# Patient Record
Sex: FEMALE | Race: WHITE | Employment: FULL TIME | URBAN - METROPOLITAN AREA
[De-identification: names, ages, dates, MRNs, and addresses within clinical notes are randomized per-mention and may not be internally consistent; named-entity substitution may affect disease eponyms.]

---

## 2017-01-17 PROBLEM — J06.9 ACUTE UPPER RESPIRATORY INFECTION: Status: ACTIVE | Noted: 2017-01-17

## 2017-01-17 PROBLEM — L40.50 PSORIATIC ARTHRITIS (HCC): Status: ACTIVE | Noted: 2017-01-17

## 2017-01-17 PROBLEM — R05.9 COUGH: Status: ACTIVE | Noted: 2017-01-17

## 2017-01-17 PROBLEM — R42 DIZZINESS: Status: ACTIVE | Noted: 2017-01-17

## 2017-01-17 PROBLEM — R53.83 FATIGUE, UNSPECIFIED TYPE: Status: ACTIVE | Noted: 2017-01-17

## 2017-01-17 PROBLEM — I10 ESSENTIAL HYPERTENSION: Status: ACTIVE | Noted: 2017-01-17

## 2017-04-08 PROCEDURE — 82607 VITAMIN B-12: CPT | Performed by: INTERNAL MEDICINE

## 2017-05-25 PROBLEM — M17.11 PRIMARY OSTEOARTHRITIS OF RIGHT KNEE: Status: ACTIVE | Noted: 2017-05-25

## 2017-07-03 PROBLEM — I73.9 PERIPHERAL VASCULAR DISEASE (HCC): Status: ACTIVE | Noted: 2017-07-03

## 2017-07-03 PROBLEM — I73.9 PERIPHERAL VASCULAR DISEASE: Status: ACTIVE | Noted: 2017-07-03

## 2017-11-01 PROBLEM — R51.9 NONINTRACTABLE HEADACHE, UNSPECIFIED CHRONICITY PATTERN, UNSPECIFIED HEADACHE TYPE: Status: ACTIVE | Noted: 2017-11-01

## 2017-11-01 PROCEDURE — 84480 ASSAY TRIIODOTHYRONINE (T3): CPT | Performed by: INTERNAL MEDICINE

## 2017-12-15 PROBLEM — M19.072 DJD (DEGENERATIVE JOINT DISEASE), ANKLE AND FOOT, LEFT: Status: ACTIVE | Noted: 2017-12-15

## 2018-01-23 PROBLEM — S42.124A: Status: ACTIVE | Noted: 2018-01-23

## 2018-02-06 PROBLEM — S42.124D: Status: ACTIVE | Noted: 2018-02-06

## 2018-03-08 PROBLEM — M79.18 MYOFASCIAL MUSCLE PAIN: Status: ACTIVE | Noted: 2018-03-08

## 2018-03-29 PROBLEM — R53.1 WEAKNESS: Status: ACTIVE | Noted: 2017-01-17

## 2018-05-07 PROBLEM — M25.511 ACUTE PAIN OF RIGHT SHOULDER: Status: ACTIVE | Noted: 2018-05-07

## 2018-06-30 PROBLEM — J01.00 ACUTE NON-RECURRENT MAXILLARY SINUSITIS: Status: ACTIVE | Noted: 2018-06-30

## 2018-06-30 PROBLEM — H10.33 ACUTE CONJUNCTIVITIS OF BOTH EYES, UNSPECIFIED ACUTE CONJUNCTIVITIS TYPE: Status: ACTIVE | Noted: 2018-06-30

## 2018-06-30 PROBLEM — R09.81 NASAL CONGESTION: Status: ACTIVE | Noted: 2018-06-30

## 2018-09-13 PROBLEM — J06.9 ACUTE UPPER RESPIRATORY INFECTION: Status: RESOLVED | Noted: 2017-01-17 | Resolved: 2018-09-13

## 2018-09-13 PROBLEM — R05.9 COUGH: Status: RESOLVED | Noted: 2017-01-17 | Resolved: 2018-09-13

## 2018-09-13 PROBLEM — J01.00 ACUTE NON-RECURRENT MAXILLARY SINUSITIS: Status: RESOLVED | Noted: 2018-06-30 | Resolved: 2018-09-13

## 2018-09-13 PROBLEM — R53.1 WEAKNESS: Status: RESOLVED | Noted: 2017-01-17 | Resolved: 2018-09-13

## 2018-09-13 PROBLEM — H10.33 ACUTE CONJUNCTIVITIS OF BOTH EYES, UNSPECIFIED ACUTE CONJUNCTIVITIS TYPE: Status: RESOLVED | Noted: 2018-06-30 | Resolved: 2018-09-13

## 2018-09-13 PROBLEM — R51.9 NONINTRACTABLE HEADACHE, UNSPECIFIED CHRONICITY PATTERN, UNSPECIFIED HEADACHE TYPE: Status: RESOLVED | Noted: 2017-11-01 | Resolved: 2018-09-13

## 2018-09-13 PROBLEM — R42 DIZZINESS: Status: RESOLVED | Noted: 2017-01-17 | Resolved: 2018-09-13

## 2018-09-13 PROBLEM — R09.81 NASAL CONGESTION: Status: RESOLVED | Noted: 2018-06-30 | Resolved: 2018-09-13

## 2018-10-12 PROCEDURE — 84080 ASSAY ALKALINE PHOSPHATASES: CPT | Performed by: INTERNAL MEDICINE

## 2018-10-12 PROCEDURE — 84075 ASSAY ALKALINE PHOSPHATASE: CPT | Performed by: INTERNAL MEDICINE

## 2018-10-12 PROCEDURE — 83516 IMMUNOASSAY NONANTIBODY: CPT | Performed by: INTERNAL MEDICINE

## 2019-01-17 PROCEDURE — 88175 CYTOPATH C/V AUTO FLUID REDO: CPT | Performed by: OBSTETRICS & GYNECOLOGY

## 2019-07-17 PROCEDURE — 87086 URINE CULTURE/COLONY COUNT: CPT | Performed by: INTERNAL MEDICINE

## 2019-11-19 PROBLEM — F51.01 PRIMARY INSOMNIA: Status: ACTIVE | Noted: 2019-11-19

## 2019-11-19 PROBLEM — G89.29 CHRONIC NECK PAIN: Status: ACTIVE | Noted: 2019-11-19

## 2019-11-19 PROBLEM — M54.2 CHRONIC NECK PAIN: Status: ACTIVE | Noted: 2019-11-19

## 2021-05-24 PROBLEM — Z12.31 ENCOUNTER FOR SCREENING MAMMOGRAM FOR MALIGNANT NEOPLASM OF BREAST: Status: ACTIVE | Noted: 2021-05-24

## 2021-05-24 PROBLEM — Z78.0 POSTMENOPAUSAL: Status: ACTIVE | Noted: 2021-05-24

## 2021-05-24 PROBLEM — E66.01 OBESITY, CLASS III, BMI 40-49.9 (MORBID OBESITY) (HCC): Status: ACTIVE | Noted: 2021-05-24

## 2021-05-24 PROBLEM — Z96.651 STATUS POST RIGHT KNEE REPLACEMENT: Status: ACTIVE | Noted: 2021-05-24

## 2021-05-24 PROBLEM — E66.813 OBESITY, CLASS III, BMI 40-49.9 (MORBID OBESITY): Status: ACTIVE | Noted: 2021-05-24

## 2022-10-08 ENCOUNTER — APPOINTMENT (OUTPATIENT)
Dept: GENERAL RADIOLOGY | Age: 57
End: 2022-10-08
Attending: NURSE PRACTITIONER
Payer: COMMERCIAL

## 2022-10-08 ENCOUNTER — HOSPITAL ENCOUNTER (EMERGENCY)
Age: 57
Discharge: HOME | End: 2022-10-08
Payer: COMMERCIAL

## 2022-10-08 VITALS
OXYGEN SATURATION: 94 % | HEART RATE: 81 BPM | BODY MASS INDEX: 38.98 KG/M2 | DIASTOLIC BLOOD PRESSURE: 78 MMHG | HEIGHT: 63 IN | WEIGHT: 220 LBS | TEMPERATURE: 98 F | SYSTOLIC BLOOD PRESSURE: 122 MMHG | RESPIRATION RATE: 16 BRPM

## 2022-10-08 DIAGNOSIS — M16.11 OSTEOARTHRITIS OF RIGHT HIP, UNSPECIFIED OSTEOARTHRITIS TYPE: ICD-10-CM

## 2022-10-08 DIAGNOSIS — M25.551 RIGHT HIP PAIN: Primary | ICD-10-CM

## 2022-10-08 PROCEDURE — 99283 EMERGENCY DEPT VISIT LOW MDM: CPT

## 2022-10-08 PROCEDURE — 73502 X-RAY EXAM HIP UNI 2-3 VIEWS: CPT | Performed by: NURSE PRACTITIONER

## 2022-10-08 PROCEDURE — 96372 THER/PROPH/DIAG INJ SC/IM: CPT

## 2022-10-08 RX ORDER — KETOROLAC TROMETHAMINE 10 MG/1
10 TABLET, FILM COATED ORAL EVERY 8 HOURS PRN
Qty: 15 TABLET | Refills: 0 | Status: SHIPPED | OUTPATIENT
Start: 2022-10-08

## 2022-10-08 RX ORDER — LIDOCAINE 50 MG/G
1 PATCH TOPICAL
Qty: 6 PATCH | Refills: 0 | Status: SHIPPED | OUTPATIENT
Start: 2022-10-08

## 2022-10-08 RX ORDER — KETOROLAC TROMETHAMINE 30 MG/ML
60 INJECTION, SOLUTION INTRAMUSCULAR; INTRAVENOUS ONCE
Status: COMPLETED | OUTPATIENT
Start: 2022-10-08 | End: 2022-10-08

## 2022-10-08 NOTE — ED INITIAL ASSESSMENT (HPI)
Pt presents with right hip and thigh pain for the past few months, worse in the last 3 days. Denies injury. States pain is worse with movement.

## 2025-05-03 ENCOUNTER — HOSPITAL ENCOUNTER (EMERGENCY)
Age: 60
Discharge: HOME | End: 2025-05-03
Attending: EMERGENCY MEDICINE
Payer: COMMERCIAL

## 2025-05-03 VITALS
DIASTOLIC BLOOD PRESSURE: 76 MMHG | BODY MASS INDEX: 42.27 KG/M2 | SYSTOLIC BLOOD PRESSURE: 145 MMHG | OXYGEN SATURATION: 98 % | HEART RATE: 91 BPM | WEIGHT: 238.56 LBS | HEIGHT: 63 IN | TEMPERATURE: 98 F | RESPIRATION RATE: 17 BRPM

## 2025-05-03 DIAGNOSIS — S61.259A DOG BITE OF FINGER, INITIAL ENCOUNTER: Primary | ICD-10-CM
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
## 2025-05-03 PROCEDURE — 90471 IMMUNIZATION ADMIN: CPT

## 2025-05-03 PROCEDURE — 99284 EMERGENCY DEPT VISIT MOD MDM: CPT

## 2025-05-03 PROCEDURE — 99283 EMERGENCY DEPT VISIT LOW MDM: CPT

## 2025-05-03 RX ORDER — LEVOTHYROXINE SODIUM 137 UG/1
137 TABLET ORAL
COMMUNITY

## 2025-05-04 NOTE — DISCHARGE INSTRUCTIONS
Keep the Area clean and dry, take prophylactic antibiotic instructions no pools hot tubs or bathtubs until completely healed    If any redness or purulent discharge be re-Evaluate

## 2025-05-04 NOTE — ED INITIAL ASSESSMENT (HPI)
To ER for eval of a dogbite to the right ring finger,as she was trying to separate her puppies as they were fighting. The pt is not UTD with her tetanus. The dog has received her first shot. Minimal bleeding is noted.

## 2025-05-04 NOTE — ED PROVIDER NOTES
Patient Seen in: [facility] In [place]      History     Chief Complaint   Patient presents with    Bite     Bitten by her own dog as she was  them     Stated Complaint: dogbite right hand not UTD with tetanus    Subjective:   HPI  59-year-old female who comes in today for evaluation of a dog bite that occurred prior to arrival.  She was  her 2 mixed breed puppies that were fighting and they accidentally got her right ring finger.  Patient has a puncture to the palmar aspect and 2 small punctures to the dorsal aspect along the middle phalanx and knuckle.  Patient is unsure of her last tetanus immunization.  The puppies are up-to-date on their shots but they have only had 1 set of shots so far.     Objective:     Past Medical History:    Anesthesia complication    cannot tolerate propofol-n,v, dizziness for 24 hours.    Back problem    Disorder of thyroid    High blood pressure    History of blood transfusion    PONV (postoperative nausea and vomiting)    Psoriasis    Psoriatic arthritis (HCC)    Visual impairment    glasses for distance              Past Surgical History:   Procedure Laterality Date    Carpal tunnel release Bilateral     Endometrial ablation      Eye surgery Left     muscle    Hysteroscopy      Knee arthroscopy      Total knee replacement Right                 Social History     Socioeconomic History    Marital status:    Tobacco Use    Smoking status: Never    Smokeless tobacco: Never   Vaping Use    Vaping status: Never Used   Substance and Sexual Activity    Alcohol use: No    Drug use: No                                Physical Exam     ED Triage Vitals [05/03/25 2004]   /76   Pulse 91   Resp 20   Temp 98.2 °F (36.8 °C)   Temp src Oral   SpO2 98 %   O2 Device None (Room air)       Current Vitals:   Vital Signs  BP: 145/76  Pulse: 91  Resp: 17  Temp: 98.2 °F (36.8 °C)  Temp src: Oral    Oxygen Therapy  SpO2: 98 %  O2 Device: None (Room  air)        Physical Exam  Vitals and nursing note reviewed.   Constitutional:       General: She is not in acute distress.     Appearance: Normal appearance. She is well-developed. She is not diaphoretic.   HENT:      Head: Normocephalic and atraumatic.   Cardiovascular:      Rate and Rhythm: Normal rate and regular rhythm.   Pulmonary:      Effort: Pulmonary effort is normal. No respiratory distress.      Breath sounds: Normal breath sounds.   Musculoskeletal:      Right hand: Swelling, laceration and tenderness present. Normal strength. Normal sensation. There is no disruption of two-point discrimination. Normal capillary refill.      Cervical back: Normal range of motion.      Comments: Patient has 3 puncture wounds to the right ring finger largest being on the dorsal aspect over the knuckle, distal sensation no nail involvement able to fully flex and extend the finger   Skin:     General: Skin is warm and dry.      Capillary Refill: Capillary refill takes less than 2 seconds.      Coloration: Skin is not pale.      Findings: No erythema or rash.   Neurological:      Mental Status: She is alert and oriented to person, place, and time.      Motor: No abnormal muscle tone.      Coordination: Coordination normal.      Deep Tendon Reflexes: Reflexes are normal and symmetric.   Psychiatric:         Behavior: Behavior normal.         Thought Content: Thought content normal.         Judgment: Judgment normal.             ED Course   Labs Reviewed - No data to display       Results       PROCEDURE NOTE:  Laceration Repair  Thorough asepsis was completed, patient has 3 superficial lacerations located on the right ring finger, we discussed that given these are superficial recommendation is not to suture we did place a Steri-Strip on the dorsal largest puncture wound  Site: right index finger   Size: .5 cm  Bacitracin and tube gauze applied following Steri-Strip          [facility]      Medical Decision Making  60yo F who comes  in after accidentally bitten by her puppy while pulling them apart.  Patient is unsure of her last tetanus immunization, dogs immunizations are up-to-date but they have only had 1 set as they are puppies    Problems Addressed:  Dog bite of finger, initial encounter: acute illness or injury    Amount and/or Complexity of Data Reviewed  ECG/medicine tests: ordered and independent interpretation performed. Decision-making details documented in ED Course.     Details: Copious asepsis 2-3 puncture wounds, Steri-Strip, tube gauze, Tdap updated  Discussion of management or test interpretation with external provider(s): Discussed with and evaluated the patient with Dr. Rainey who agrees with assessment and plan.    Risk  OTC drugs.  Risk Details: Clinical Impression: Dog bite of the finger      The differential diagnosis before testing included dog bite, laceration, tendon injury which is a medical condition that poses a threat to life/function.     PPX antibiotics, keep clean and dry, Augmentin PO, tylenol and ibuprofen           Disposition and Plan     Clinical Impression:  1. Dog bite of finger, initial encounter         Disposition:  Discharge  5/3/2025  9:25 pm    Follow-up:  Noris Whitfield MD  [id number]  [address]  [facility] [address]  [phone]    Schedule an appointment as soon as possible for a visit  If symptoms worsen          Medications Prescribed:  Current Discharge Medication List        START taking these medications    Details   amoxicillin clavulanate 875-125 MG Oral Tab Take 1 tablet by mouth 2 (two) times daily for 5 days.  Qty: 10 tablet, Refills: 0             Supplementary Documentation: